# Patient Record
Sex: MALE | Race: WHITE | NOT HISPANIC OR LATINO | URBAN - METROPOLITAN AREA
[De-identification: names, ages, dates, MRNs, and addresses within clinical notes are randomized per-mention and may not be internally consistent; named-entity substitution may affect disease eponyms.]

---

## 2020-10-02 ENCOUNTER — HOSPITAL ENCOUNTER (OUTPATIENT)
Dept: URGENT CARE | Facility: CLINIC | Age: 25
Discharge: HOME | End: 2020-10-02
Attending: EMERGENCY MEDICINE

## 2022-04-18 ENCOUNTER — OFFICE VISIT (OUTPATIENT)
Dept: FAMILY MEDICINE CLINIC | Facility: CLINIC | Age: 27
End: 2022-04-18

## 2022-04-18 VITALS
DIASTOLIC BLOOD PRESSURE: 90 MMHG | HEART RATE: 83 BPM | OXYGEN SATURATION: 99 % | TEMPERATURE: 98.7 F | SYSTOLIC BLOOD PRESSURE: 140 MMHG

## 2022-04-18 DIAGNOSIS — S61.215A LACERATION OF LEFT RING FINGER WITHOUT FOREIGN BODY WITHOUT DAMAGE TO NAIL, INITIAL ENCOUNTER: ICD-10-CM

## 2022-04-18 DIAGNOSIS — S62.625A CLOSED DISPLACED FRACTURE OF MIDDLE PHALANX OF LEFT RING FINGER, INITIAL ENCOUNTER: ICD-10-CM

## 2022-04-18 DIAGNOSIS — S67.22XA CRUSH INJURY OF HAND, LEFT, INITIAL ENCOUNTER: Primary | ICD-10-CM

## 2022-04-18 DIAGNOSIS — Z79.899 HIGH RISK MEDICATION USE: ICD-10-CM

## 2022-04-18 DIAGNOSIS — S61.213A LACERATION OF LEFT MIDDLE FINGER WITHOUT FOREIGN BODY WITHOUT DAMAGE TO NAIL, INITIAL ENCOUNTER: ICD-10-CM

## 2022-04-18 PROCEDURE — 99203 OFFICE O/P NEW LOW 30 MIN: CPT | Performed by: NURSE PRACTITIONER

## 2022-04-18 PROCEDURE — 12044 INTMD RPR N-HF/GENIT7.6-12.5: CPT | Performed by: NURSE PRACTITIONER

## 2022-04-18 PROCEDURE — 96372 THER/PROPH/DIAG INJ SC/IM: CPT | Performed by: NURSE PRACTITIONER

## 2022-04-18 RX ORDER — TRAMADOL HYDROCHLORIDE 50 MG/1
50 TABLET ORAL EVERY 6 HOURS PRN
Qty: 16 TABLET | Refills: 0 | Status: SHIPPED | OUTPATIENT
Start: 2022-04-18

## 2022-04-18 RX ORDER — KETOROLAC TROMETHAMINE 30 MG/ML
30 INJECTION, SOLUTION INTRAMUSCULAR; INTRAVENOUS ONCE
Status: COMPLETED | OUTPATIENT
Start: 2022-04-18 | End: 2022-04-18

## 2022-04-18 RX ADMIN — KETOROLAC TROMETHAMINE 30 MG: 30 INJECTION, SOLUTION INTRAMUSCULAR; INTRAVENOUS at 11:34

## 2022-04-18 RX ADMIN — KETOROLAC TROMETHAMINE 30 MG: 30 INJECTION, SOLUTION INTRAMUSCULAR; INTRAVENOUS at 11:35

## 2022-04-18 NOTE — PROGRESS NOTES
Chief Complaint  Hand Injury (Smashed LT middle and ring finger )    Subjective            Trav Rosado presents to [facility]  History of Present Illness     Patient presents to the office today with acute left hand pain and lacerations.  Reports dropping an air compressor on his left hand prior to arrival.  Currently rating his pain at 10 out of of 10 on a 1-10 scale.  He reports being up-to-date on his tetanus - states that he received a tetanus in the past year.  Recently received stitches for a separate injury at an urgent care center.  He denies any numbness or tingling of his fingers.  States that he can move all of his fingers on the left hand.      Past Medical History:   Diagnosis Date   • No pertinent past medical history      No Known Allergies     Past Surgical History:   Procedure Laterality Date   • TONSILLECTOMY        Social History     Tobacco Use   • Smoking status: Former Smoker   • Smokeless tobacco: Current User       History reviewed. No pertinent family history.     Health Maintenance Due   Topic Date Due   • ANNUAL PHYSICAL  Never done   • COVID-19 Vaccine (1) Never done   • TDAP/TD VACCINES (2 - Tdap) 01/19/2016   • HEPATITIS C SCREENING  Never done        No current outpatient medications on file prior to visit.     No current facility-administered medications on file prior to visit.       Immunization History   Administered Date(s) Administered   • DTaP, Unspecified 08/25/1997   • MMR 08/25/1997   • OPV 08/25/1997   • Td 01/19/2006       Review of Systems     Objective     /90   Pulse 83   Temp 98.7 °F (37.1 °C)   SpO2 99%       Physical Exam  Vitals reviewed.   Constitutional:       General: He is not in acute distress.     Appearance: Normal appearance. He is well-developed.      Comments: Appears to be in pain   HENT:      Head: Normocephalic and atraumatic.   Eyes:      General: No scleral icterus.     Conjunctiva/sclera: Conjunctivae  normal.   Cardiovascular:      Rate and Rhythm: Normal rate and regular rhythm.      Pulses: Normal pulses.      Heart sounds: No murmur heard.  Pulmonary:      Effort: Pulmonary effort is normal. No respiratory distress.      Breath sounds: Normal breath sounds. No wheezing, rhonchi or rales.   Musculoskeletal:      Left hand: Swelling, laceration and tenderness present. Decreased range of motion.      Right lower leg: No edema.      Left lower leg: No edema.      Comments: Swelling of the 3rd and 4th digits is present. There are laceration of the 3rd and 4th digits present. ROM is limited, but he can wiggle all digits. He reports sensation of all digits.      Skin:     General: Skin is warm and dry.   Neurological:      Mental Status: He is alert and oriented to person, place, and time.   Psychiatric:         Mood and Affect: Mood and affect normal.         Behavior: Behavior normal.         Thought Content: Thought content normal.         Judgment: Judgment normal.       CLINICAL PHOTOGRAPHS - SCAN - Left Hand (04/18/2022)  CLINICAL PHOTOGRAPHS - SCAN - Left Hand (04/18/2022)    Result Review :     The following data was reviewed by: AJ Garza on 04/18/2022:        Data reviewed: Radiologic studies :   XR Hand 3+ View Left (In Office) (04/18/2022 09:29) -  Left hand series demonstrating intra-articular fracture at the base of the middle phalanx of the 4th digit.  Fracture fragments are displaced 1 to 2 mm.    Laceration Repair    Date/Time: 4/18/2022 1:47 PM  Performed by: Bettie Mcarthur APRN  Authorized by: Bettie Mcarthur APRN   Body area: upper extremity  Location details: left ring finger  Laceration length: 4.5 cm  Foreign bodies: no foreign bodies  Tendon involvement: none  Nerve involvement: none  Vascular damage: no  Anesthesia: local infiltration    Anesthesia:  Local Anesthetic: lidocaine 2% without epinephrine  Anesthetic total: 1 mL    Sedation:  Patient sedated:  "no    Preparation: Patient was prepped and draped in the usual sterile fashion.  Amount of cleaning: extensive (left hand was washed/cleaned with a mixture of soap and warm water, as well as peroxide and sterile water for irrigation)  Debridement: none  Degree of undermining: none  Skin closure: 5-0 nylon  Technique: simple  Approximation: close  Approximation difficulty: simple  Dressing: 4x4 sterile gauze, antibiotic ointment and splint (3\" orthoglass splint applied d/t fracture of 2nd phalanx, 4th digit)  Patient tolerance: patient tolerated the procedure well with no immediate complications    Laceration Repair    Date/Time: 4/18/2022 1:52 PM  Performed by: Bettie Mcarthur APRN  Authorized by: Bettie Mcarthur APRN   Body area: upper extremity  Location details: left long finger  Laceration length: 2.5 cm  Foreign bodies: no foreign bodies  Tendon involvement: none  Nerve involvement: none  Vascular damage: no  Anesthesia: local infiltration    Anesthesia:  Local Anesthetic: lidocaine 2% without epinephrine  Anesthetic total: 0.5 mL    Sedation:  Patient sedated: no    Preparation: Patient was prepped and draped in the usual sterile fashion.  Amount of cleaning: extensive  Debridement: none  Degree of undermining: none  Skin closure: 5-0 nylon  Number of sutures: 3  Technique: simple  Approximation: close  Approximation difficulty: simple  Dressing: 4x4 sterile gauze, antibiotic ointment and splint  Patient tolerance: patient tolerated the procedure well with no immediate complications    Laceration Repair    Date/Time: 4/18/2022 1:54 PM  Performed by: Bettie Mcarthur APRN  Authorized by: Bettie Mcarthur APRN   Body area: upper extremity  Location details: left long finger  Laceration length: 1.2 cm  Foreign bodies: no foreign bodies  Tendon involvement: none  Nerve involvement: none  Vascular damage: no  Anesthesia: local infiltration    Anesthesia:  Local Anesthetic: lidocaine 2% without " epinephrine  Anesthetic total: 0.5 mL    Sedation:  Patient sedated: no    Preparation: Patient was prepped and draped in the usual sterile fashion.  Amount of cleaning: extensive  Debridement: none  Degree of undermining: none  Skin closure: 5-0 nylon  Number of sutures: 1  Technique: simple  Approximation: close  Approximation difficulty: simple  Dressing: 4x4 sterile gauze, antibiotic ointment and splint  Patient tolerance: patient tolerated the procedure well with no immediate complications            Assessment and Plan      Diagnoses and all orders for this visit:    1. Crush injury of hand, left, initial encounter (Primary)  -     XR Hand 3+ View Left (In Office)  -     ketorolac (TORADOL) injection 30 mg  -     ketorolac (TORADOL) injection 30 mg  -     traMADol (ULTRAM) 50 MG tablet; Take 1 tablet by mouth Every 6 (Six) Hours As Needed for Moderate Pain  or Severe Pain .  Dispense: 16 tablet; Refill: 0    2. Closed displaced fracture of middle phalanx of left ring finger, initial encounter  -     Ambulatory Referral to Hand Surgery  -     traMADol (ULTRAM) 50 MG tablet; Take 1 tablet by mouth Every 6 (Six) Hours As Needed for Moderate Pain  or Severe Pain .  Dispense: 16 tablet; Refill: 0    3. High risk medication use  -     traMADol (ULTRAM) 50 MG tablet; Take 1 tablet by mouth Every 6 (Six) Hours As Needed for Moderate Pain  or Severe Pain .  Dispense: 16 tablet; Refill: 0    4. Laceration of left middle finger without foreign body without damage to nail, initial encounter  Comments:  x2  Orders:  -     Laceration Repair  -     Laceration Repair    5. Laceration of left ring finger without foreign body without damage to nail, initial encounter  -     Laceration Repair            Follow Up     Return if symptoms worsen or fail to improve.     He had some improvement in his pain while in the office after receiving a Toradol injection.  I will give him a prescription for tramadol to use every 6 hours as  needed for moderate to severe pain, but otherwise he will use Tylenol and/or ibuprofen as needed for pain.  He may apply ice to the area as needed.  He is to monitor for good coloring in his fingers.  Ortho-Glass splint was placed for support.  We were able to get him in with hand surgery on 4/21/2022.  He is to call or return to the clinic with any immediate concerns, or go to the emergency room.    Patient was given instructions and counseling regarding his condition or for health maintenance advice. Please see specific information pulled into the AVS if appropriate.